# Patient Record
Sex: FEMALE | Race: AMERICAN INDIAN OR ALASKA NATIVE | URBAN - METROPOLITAN AREA
[De-identification: names, ages, dates, MRNs, and addresses within clinical notes are randomized per-mention and may not be internally consistent; named-entity substitution may affect disease eponyms.]

---

## 2020-04-29 ENCOUNTER — COMMUNICATION - HEALTHEAST (OUTPATIENT)
Dept: EMERGENCY MEDICINE | Facility: CLINIC | Age: 30
End: 2020-04-29

## 2020-04-30 ENCOUNTER — AMBULATORY - HEALTHEAST (OUTPATIENT)
Dept: CARE COORDINATION | Facility: CLINIC | Age: 30
End: 2020-04-30

## 2020-04-30 DIAGNOSIS — U07.1 COVID-19: ICD-10-CM

## 2020-05-01 ENCOUNTER — PATIENT OUTREACH (OUTPATIENT)
Dept: CARE COORDINATION | Facility: CLINIC | Age: 30
End: 2020-05-01

## 2020-05-01 DIAGNOSIS — U07.1 COVID-19: Primary | ICD-10-CM

## 2020-05-01 NOTE — PROGRESS NOTES
Clinic Care Coordination Contact    Situation:  Called patient from hospital discontinue list with No PCP diagnosed with COVID    Background: Patient at  ED 4/28/20 dx with Covid.  In the country 1/17/20 from [place].  Hx of Asthma.    Assessment: Patient c/o of continued diarrhea, sob, headache, body aches, denies fevers.  Patient will be in the country for an unknown amount of time due to not being able to travel due to virus/quarantine, etc.  Writer spoke to RN Supervisor and SW and was recommended for patient to establish care.  Appointment made for Virtual Visit 5/4 @ 8:00.  Patient instructed if symptoms were to worsen to return to ED.     Plan/Recommendations: Writer sending a CCC referral to assist with [facility], etc as patient does not have medical insurance.  Patient is aware this call from either RN or SW will be coming.

## 2020-05-05 ENCOUNTER — PATIENT OUTREACH (OUTPATIENT)
Dept: CARE COORDINATION | Facility: CLINIC | Age: 30
End: 2020-05-05

## 2020-05-05 ENCOUNTER — APPOINTMENT (OUTPATIENT)
Dept: INTERPRETER SERVICES | Facility: CLINIC | Age: 30
End: 2020-05-05

## 2020-05-05 DIAGNOSIS — U07.1 INFECTION DUE TO 2019-NCOV: Primary | ICD-10-CM

## 2020-05-05 ASSESSMENT — ACTIVITIES OF DAILY LIVING (ADL): DEPENDENT_IADLS:: INDEPENDENT

## 2020-05-05 NOTE — PROGRESS NOTES
Clinic Care Coordination Contact    Patient reports being admitted to [facility]  ED on 4/28 with complaints of 9-10 days of headache, cough, body aches, nasal congestion, and lack of taste and smell. Her neighbor has similar symptoms and had positive COVID test that resulted today.    RN CC spoke with patient over phone with .  Patient states she is doing OK.  She still has some upset stomach and diarrhea.  Sense of taste and smell are still absent.  Otherwise she is feeling ok an maintaining quarantine status.    Patient is from [facility].  Arrived in country on 1/17/20.  Patient states she is on tourist visa and does plan to go back to [facility] as soon as she is able to travel.      It was recommended for patient to establish care.  Appointment made for Virtual Visit 5/4 @ 8:00. Patient was a no show to this visit.  Patient states she was asleep and didn't hear phone.  She apologized, and would like to reschedule. She has questions for doctor and would like to know when she can go home.  Patient did mention she does not have medical insurance and no way of paying out of pocket for visit.  RN CC explained that we could apply for Gita care if appropriate.       COVID-19 GetWell Loop:  Testing Results: positive  Email on file OR Smartphone: Yes  Does the patient speak English: No, but can read english and has family to interpret if needed.  Is the patient pregnant: No  Candidate for GetWell Loop: Yes-referral sent      Charlene Cardenas RN-BSN  Care Coordination  Phone:  [phone]  Email: [email or web address]  [facility], [place] and [facility]

## 2020-05-06 NOTE — TELEPHONE ENCOUNTER
Called patient with   Scheduled patient for 5/8/20 with Maribel phone visit.       Micaela Paredes MA// May 6, 2020 4:20 PM

## 2020-05-08 ENCOUNTER — VIRTUAL VISIT (OUTPATIENT)
Dept: PEDIATRICS | Facility: CLINIC | Age: 30
End: 2020-05-08

## 2020-05-08 ENCOUNTER — TELEPHONE (OUTPATIENT)
Dept: PEDIATRICS | Facility: CLINIC | Age: 30
End: 2020-05-08

## 2020-05-08 DIAGNOSIS — U07.1 COVID-19 VIRUS INFECTION: ICD-10-CM

## 2020-05-08 DIAGNOSIS — J45.20 MILD INTERMITTENT ASTHMA WITHOUT COMPLICATION: Primary | ICD-10-CM

## 2020-05-08 DIAGNOSIS — R43.0 ANOSMIA: ICD-10-CM

## 2020-05-08 PROCEDURE — 99203 OFFICE O/P NEW LOW 30 MIN: CPT | Mod: 95 | Performed by: NURSE PRACTITIONER

## 2020-05-08 ASSESSMENT — ASTHMA QUESTIONNAIRES
QUESTION_5 LAST FOUR WEEKS HOW WOULD YOU RATE YOUR ASTHMA CONTROL: COMPLETELY CONTROLLED
QUESTION_4 LAST FOUR WEEKS HOW OFTEN HAVE YOU USED YOUR RESCUE INHALER OR NEBULIZER MEDICATION (SUCH AS ALBUTEROL): NOT AT ALL
QUESTION_3 LAST FOUR WEEKS HOW OFTEN DID YOUR ASTHMA SYMPTOMS (WHEEZING, COUGHING, SHORTNESS OF BREATH, CHEST TIGHTNESS OR PAIN) WAKE YOU UP AT NIGHT OR EARLIER THAN USUAL IN THE MORNING: NOT AT ALL
QUESTION_1 LAST FOUR WEEKS HOW MUCH OF THE TIME DID YOUR ASTHMA KEEP YOU FROM GETTING AS MUCH DONE AT WORK, SCHOOL OR AT HOME: NONE OF THE TIME
QUESTION_2 LAST FOUR WEEKS HOW OFTEN HAVE YOU HAD SHORTNESS OF BREATH: NOT AT ALL
ACT_TOTALSCORE: 25

## 2020-05-08 NOTE — TELEPHONE ENCOUNTER
Call placed to patient to get more information  LM for pt to return call to the clinic    Sarai Patel RN on 5/8/2020 at 4:54 PM

## 2020-05-08 NOTE — PROGRESS NOTES
"Carina Dalal is a 29 year old female who is being evaluated via a billable telephone visit.      The patient has been notified of following:     \"This telephone visit will be conducted via a call between you and your physician/provider. We have found that certain health care needs can be provided without the need for a physical exam.  This service lets us provide the care you need with a short phone conversation.  If a prescription is necessary we can send it directly to your pharmacy.  If lab work is needed we can place an order for that and you can then stop by our lab to have the test done at a later time.    Telephone visits are billed at different rates depending on your insurance coverage. During this emergency period, for some insurers they may be billed the same as an in-person visit.  Please reach out to your insurance provider with any questions.    If during the course of the call the physician/provider feels a telephone visit is not appropriate, you will not be charged for this service.\"    Patient has given verbal consent for Telephone visit?  Yes    What phone number would you like to be contacted at? Interpeter needed. [phone] option 1, pt phone number is [phone]    How would you like to obtain your AVS? Mail a copy    Subjective     Carina Dalal is a 29 year old female who presents to clinic today for the following health issues:    HPI    How are you feeling today? Much better, but can not taste or smell but starting to come back.  In the past 24 hours have you had shortness of breath when speaking, walking, or climbing stairs? No  Do you have a cough? I don't have a cough  When is the last time you had a fever greater than 100? Never had a fever.  Are you having any other symptoms? Diarrhea, Loss of sense of taste or smell, Fatigue, Body aches and Headaches   Do you have any other stressors you would like to discuss with your provider? Financial Concerns    She was seen at " [facility] ED 4/28/20 for 9 day history of chills, body aches, loss of taste and smell and known exposure to Covid  Chest xray normal  O2 saturations 100%   Positive Covid-19  She never had fevers, cough or SOB     She lives in [place] [facility] [place], visiting friend  She was supposed to return 9 days ago  Unable to fly back until tests negative for Covid  She is staying at her friend's home  Denies safety concerns  Reports she is feeling better. Chills and body aches have resolves  Taste and smell are coming back some, but not completely returned  She has no other symptoms at this time    She has a history of asthma which is well controlled  ACT 25  Not using any inhaler  Denies cough, wheeze or SOB      Patient Active Problem List   Diagnosis   (none) - all problems resolved or deleted     No past surgical history on file.    Social History     Tobacco Use     Smoking status: Not on file   Substance Use Topics     Alcohol use: Not on file     No family history on file.        Reviewed and updated as needed this visit by Provider         Review of Systems   ROS COMP: Constitutional, HEENT, cardiovascular, pulmonary, gi and gu systems are negative, except as otherwise noted.       Objective   Reported vitals:  There were no vitals taken for this visit.   healthy, alert and no distress  PSYCH: Alert and oriented times 3; coherent speech, normal   rate and volume, able to articulate logical thoughts, able   to abstract reason, no tangential thoughts, no hallucinations   or delusions  Her affect is normal  RESP: No cough, no audible wheezing, able to talk in full sentences  Remainder of exam unable to be completed due to telephone visits    Diagnostic Test Results:  Labs reviewed in Epic        Assessment/Plan:  1. Mild intermittent asthma without complication  - Uncomplicated    2. COVID-19 virus infection  - Symptoms improving. She requires a negative test require to being able to fly home. Will need to wait until all symptoms resolve  then contact clinic for testing. Currently she does not meet criteria for testing as criteria does not include need for travel.     3. Anosmia      Phone call duration:  13 minutes    DL Rivas CNP

## 2020-05-08 NOTE — TELEPHONE ENCOUNTER
Please call patient regarding our visit today.  1) Who is requiring a negative test prior to her flying home? The airline? Her home country of [facility]?  2) If it is in fact required to have a negative test, she will need to wait until all symptoms have resolved. Please also let her know that some people test positive even after complete resolution of symptoms  3) If she requires testing and symptoms have completely resolved, she should call clinic and will need to be referred to algorithm for testing. I'm not sure she would be able to do [facility].

## 2020-05-09 ASSESSMENT — ASTHMA QUESTIONNAIRES: ACT_TOTALSCORE: 25

## 2020-05-11 NOTE — TELEPHONE ENCOUNTER
Unfortunately, we can't order test in this case. I did escalate this to our chief medical officer. Our testing restrictions may change over time and she is welcome to continue to do oncares to see if/when they change. Unsure of alternative testing sites.

## 2020-05-11 NOTE — TELEPHONE ENCOUNTER
Patient called back.  Patient states her home country will not let her return unless she has a negative test.  Patient states all symptoms have resolved.  Informed her she may still have a positive test even with no symptoms.  Wondering about where she can get testing done.    Please advise. Patient aware we may not be able to return her call until tomorrow.  Kajal Aly, CMA

## 2020-05-12 ENCOUNTER — APPOINTMENT (OUTPATIENT)
Dept: INTERPRETER SERVICES | Facility: CLINIC | Age: 30
End: 2020-05-12

## 2020-05-12 NOTE — TELEPHONE ENCOUNTER
I called and spoke with the pt with an  on the line and relayed the providers message. The pt is aware and has no further questions at this time.   Merline Jack on 5/12/2020 at 12:29 PM

## 2020-11-20 ENCOUNTER — APPOINTMENT (OUTPATIENT)
Dept: INTERPRETER SERVICES | Facility: CLINIC | Age: 30
End: 2020-11-20

## 2021-01-04 ENCOUNTER — HEALTH MAINTENANCE LETTER (OUTPATIENT)
Age: 31
End: 2021-01-04

## 2021-06-07 NOTE — TELEPHONE ENCOUNTER
"Coronavirus (COVID-19) Notification    Patient  Carina Dalal     Reason for call  Notify of Positive Coronavirus (COVID-19) lab results, assess symptoms,  review [facility] recommendations    Lab Result    Lab test:  2019-nCoV rRt-PCR or SARS-CoV-2 PCR    Oropharyngeal AND/OR nasopharyngeal swabs is POSITIVE for 2019-nCoV RNA/SARS-COV-2 PCR (COVID-19 virus)    RN Recommendations/Instructions per [facility] Coronavirus COVID-19 recommendations    Brief introduction script  Hi, My name is Lizzette and I am calling on behalf of [employer].  We were notified that your Coronavirus test (COVID-19) for was POSITIVE for the virus.  I have some information to relay to you but first I wanted to mention that the [place] Dept of Health will be contacting you shortly [it's possible MD already called Patient] to talk to you more about how you are feeling and other people you have had contact with who might now also have the virus.  Also, [facility] is Partnering with the [facility] for Covid-19 research, you may be contacted directly by research staff.    Assessment (Inquire about Patient's current symptoms)  Carina advised of result and information noted below per .  Currently \"pretty bad stomach ache, I have a lot of boogers like stuffy nose and also headache\".      If at time of call, Patients symptoms hare worsened, the Patient should contact 911 or have someone drive them to Emergency Dept promptly:      If Patient calling 911, inform 911 personal that you have tested positive for the Coronavirus (COVID-19).  Place mask on and await 911 to arrive.    If Emergency Dept, If possible, please have another adult drive you to the Emergency Dept but you need to wear mask when in contact with other people.      Review information with Patient    Since you tested POSITIVE for the COVID-19 virus, it is important that you protect others from being exposed and infected with this " virus.    [For safety, it's very important to follow these rules.]    First, stay home and away from others (self-isolate) until:     You've had no fever--and no medicine that reduces fever--for 3 full days (72 hours). And      Your other symptoms have gotten better. For example, your cough or breathing has improved. And     At least 7 days have passed since your symptoms started.  During this time:    Don't go to work, school or anywhere else.     Stay away from others in your home. No hugging, kissing or shaking hands.   Stay in a specific room away from other people in your home as much as possible.  Use separate bathroom, if possible.  Wear a facemask if you need to be around other people in your home.     Don't let anyone visit.    Cover your mouth and nose with a mask, tissue or wash cloth to avoid spreading germs.       Wash your hands and face with soap and water.    You should restrict contact with pets and other animals while you are sick with COVID-19. Avoid contact with your pet, including petting, snuggling, being kissed or licked, and sharing food. When possible, have another member of your household care for your animals while you are sick.    You should not go back to work until you meet the guidelines above for ending your home isolation. You should meet these along with any other guidelines that your employer has.  We will be sending you a letter that includes much of this information and will serve as formal notice for your employer's medical guidelines for going back to work. You must meet the above guidelines before going back to work in person.  Letter sent (Yes/No): Yes    How can I take care of myself?    Take Tylenol (acetaminophen) for fever or pain. If you have liver or kidney problems, ask your family doctor if it's okay to take Tylenol.   Take either:   - 650 mg (two 325 mg pills) every 4 to 6 hours, or   - 1,000 mg (two 500 mg pills) every 8 hours as needed.   - Note: Don't take more  than 3,000 mg in one day.    If you have other health problems (like cancer, heart failure, an organ transplant or severe kidney disease): Call your specialty clinic if you don't feel better in the next 2 days.      Know when to call 911: If your breathing is so bad that it keeps you from doing normal activities, call 911 or go to the emergency room. Tell them that you've been staying home and may have COVID-19.      Sign up for [employer]. We know it's scary to hear that you have COVID-19. We want to track your symptoms to make sure you're okay over the next 2 weeks. Please look for an email from [employer]--this is a free, online program that we'll use to keep in touch. To sign up, follow the link in the email. Learn more at [email or web address].    Where can I get more information?    To learn the [place]'s guidelines for staying home, please visit the [facility] website at [email or web address].    To learn more about COVID-19 and how to care for yourself at home, please visit the CDC website at https://www.cdc.gov/coronavirus/2019-ncov/about/steps-when-sick.html.    For more options for care at [facility], please visit our website at [email or web address]      [place] Dept of Health ([facility]) COVID-19 Hotline:   [phone]    Lizzette Moyer RN

## 2021-06-20 NOTE — LETTER
Letter by Shari Moyer RN at      Author: Shari Moyer RN Service: -- Author Type: --    Filed:  Encounter Date: 4/29/2020 Status: (Other)           Carina Dalal  [address] [place] [address]    This letter provides a written record that you were tested for COVID-19 on 4/28/2020.     Your result was positive.     This means that we found the virus that causes COVID-19 in your sample.    How can I protect others?    For safety, its very important to follow these rules.    First, stay home and away from others (self-isolate) until:      Youve had no fever--and no medicine that reduces fever--for 3 full days (72 hours). And?     Your other symptoms have gotten better. For example, your cough or breathing has improved. And?    At least 7 days have passed since your symptoms started.    During this time:    Dont go to work, school or anywhere else.     Stay away from others in your home. No hugging, kissing or shaking hands.    Dont let anyone visit.    Cover your mouth and nose with a mask, tissue or wash cloth to avoid spreading germs.    Wash your hands and face often with soap and water.    You should not go back to work until you meet the guidelines above for ending your home isolation. You should meet these along with any other guidelines that your employer has.    Employers: This document serves as formal notice of your employees medical guidelines for going back to work. They must meet the above guidelines before going back to work in person.    How can I take care of myself?    1. Take Tylenol (acetaminophen) for fever or pain. If you have liver or kidney problems, ask your family doctor if its okay to take Tylenol.     Take either:     650 mg (two 325 mg pills) every 4 to 6 hours, or?    1,000 mg (two 500 mg pills) every 8 hours as needed.     Note: Dont take more than 3,000 mg in one day.    2. If you have other health problems (like cancer, heart failure, an organ transplant or  severe kidney disease): Call your specialty clinic if you dont feel better in the next 2 days.    3. Know when to call 911: If your breathing is so bad that it keeps you from doing normal activities, call 911 or go to the emergency room. Tell them that youve been staying home and may have COVID-19.    4. Sign up for [employer]. We know its scary to hear that you have COVID-19. We want to track your symptoms to make sure youre okay over the next 2 weeks. Please look for an email from [employer]--this is a free, online program that well use to keep in touch. To sign up, follow the link in the email. Learn more at [email or web address].      Where can I get more information?    To learn the [facility] guidelines for staying home, please visit the [facility] website at [email or web address].    To learn more about COVID-19 and how to care for yourself at home, please visit the CDC website at https://www.cdc.gov/coronavirus/2019-ncov/about/steps-when-sick.html.    For more options for care at [facility], please visit our website at [email or web address]  1. the use of medicine that reduces fevers), AND  2. other symptoms (such as cough, shortness of breath) have gotten better, AND  3. at least 7 days have passed since your symptoms first appeared.    Going back to work  Check with your employer for any guidelines to follow for going back to work.    Employers: This document serves as formal notice that your employee tested negative for COVID-19, as of the testing date shown above.    For questions regarding this letter or your Negative COVID-19 result, call [phone] between 8A to 6:30P (M-F) and 10A to 6:30P (weekends).

## 2021-10-10 ENCOUNTER — HEALTH MAINTENANCE LETTER (OUTPATIENT)
Age: 31
End: 2021-10-10

## 2022-01-30 ENCOUNTER — HEALTH MAINTENANCE LETTER (OUTPATIENT)
Age: 32
End: 2022-01-30

## 2022-09-24 ENCOUNTER — HEALTH MAINTENANCE LETTER (OUTPATIENT)
Age: 32
End: 2022-09-24

## 2023-05-08 ENCOUNTER — HEALTH MAINTENANCE LETTER (OUTPATIENT)
Age: 33
End: 2023-05-08